# Patient Record
(demographics unavailable — no encounter records)

---

## 2025-04-21 NOTE — ADDENDUM
[FreeTextEntry1] : I spent greater than 75% of consult in face-to-face counseling and coordination of care in this patient with a history of a right breast cancer who underwent bilateral mastectomies and comes in now for routine breast cancer screening/surveillance.

## 2025-04-21 NOTE — HISTORY OF PRESENT ILLNESS
[FreeTextEntry1] : The patient is a 64-year-old G4, P3 postmenopausal white female of [place] and [place] descent.  She underwent menarche at age 10 and had her first child at age 29.  She underwent menopause at age 50 and never took any hormone replacement therapy.  She has no family history of breast or ovarian cancer.  Her brother passed away from pancreatic cancer at age 66.  The patient had a gastric sleeve operation in August 2018 and had lost about 90 pounds over about 6 months.  She underwent a screening mammography on March 14, 2019 at [facility] [facility] in the [facility] and was noted to have some increasing calcifications in the right breast lower outer quadrant as well as subareolar region.  Stereotactic biopsy was performed in the right breast retroareolar region and right breast lower outer quadrant on March 27, 2019 and the retroareolar calcifications were benign but the lower outer quadrant area did show some stromal fibrosis and some classical type LCIS.  She underwent a partial mastectomy of this area of LCIS in the right breast lower outer quadrant on April 9, 2019 and did have a hematoma found at the time of the excision and I could not find the clip however the entire biopsy cavity was removed which was confirmed on post excision diagnostic mammography.  The wide excision specimen did show an 8 mm focus of classical type invasive lobular cancer which was ER positive greater than 95%, OH positive greater than 90%, HER-2/tami negative with a low Ki-67 of 10%.  Margins were all negative but there was some LCIS on the posterior and anterior margins.  This was a pathologic prognostic stage IA breast cancer.  [employer] genetic panel testing from April 2019 was negative.  She then underwent a bilateral breast MRI postoperatively on April 17, 2019 just showing postsurgical changes in the right breast and was brought back to the operating room and underwent a right breast sentinel lymph node biopsy on April 18, 2019 which did show a micromet in the one sentinel node with a focus measuring 0.4 mm.  The patient saw Dr. Valentine in [place] at [facility] for a medical oncology evaluation and then did follow-up with Dr. Drew Smalls for short period of time.  She decided to undergo bilateral prophylactic nipple sparing mastectomies with bilateral direct to implant reconstructions which was performed with Dr. Santacruz on May 30, 2019.  Final pathology just showed some classical type LCIS remaining in the right breast and the left mastectomy just showed intraductal papillomas.  Later review of that pathology did show some residual invasive lobular cancer around the previous wide excision site but this did not change the patient's management or stage.  She comes in now for routine follow-up and was on Arimidex but was switched to Femara and was following up with Dr. Valentine in [place], [place], however Dr. Valentine  stopped practicing.  She had exchange of her implants by Dr. Santacruz on June 18, 2020.  She comes in now for routine follow-up and is currently on Letrozole and [place] [facility]

## 2025-04-21 NOTE — ASSESSMENT
[FreeTextEntry1] : The patient is a 64-year-old G4, P3 postmenopausal white female of [place] and [place] descent. She underwent menarche at age 10 and had her first child at age 29. She underwent menopause at age 50 and never took any hormone replacement therapy. She has no family history of breast or ovarian cancer. Her brother passed away from pancreatic cancer at age 66. The patient had a gastric sleeve operation in August 2018 and had lost about 90 pounds over about 6 months. She underwent a screening mammography on March 14, 2019 at [facility] in the [facility] and was noted to have some increasing calcifications in the right breast lower outer quadrant as well as subareolar region. Stereotactic biopsy was performed in the right breast retroareolar region and right breast lower outer quadrant on March 27, 2019 and the retroareolar calcifications were benign but the lower outer quadrant area did show some stromal fibrosis and some classical type LCIS. She underwent a partial mastectomy of this area of LCIS in the right breast lower outer quadrant on April 9, 2019 and did have a hematoma found at the time of the excision and I could not find the clip however the entire biopsy cavity was removed which was confirmed on post excision diagnostic mammography. The wide excision specimen did show an 8 mm focus of classical type invasive lobular cancer which was ER positive greater than 95%, MO positive greater than 90%, HER-2/tami negative with a low Ki-67 of 10%. Margins were all negative but there was some LCIS on the posterior and anterior margins. [employer] genetic panel testing from April 2019 was negative. She then underwent a bilateral breast MRI postoperatively on April 17, 2019 just showing postsurgical changes in the right breast and was brought back to the operating room and underwent a right breast sentinel lymph node biopsy on April 18, 2019 which did show a micromet in the one sentinel node with a focus measuring 0.4 mm. This was a pathologic prognostic stage IA breast cancer. The patient saw Dr. Valentine in [place] at [facility] for medical oncology evaluation and then did follow-up with Dr. Drew Smalls for a short period of time. She decided to undergo bilateral prophylactic nipple sparing mastectomies with bilateral direct to implant reconstructions which was performed with Dr. Santacruz on May 30, 2019. Final pathology just showed some classical type LCIS remaining in the right breast and the left mastectomy just showed intraductal papillomas. Later review of that pathology did show some residual invasive lobular cancer around the previous wide excision site but this did not change the patient's management or stage. She comes in now for routine follow-up and was on Arimidex and was following up with Dr. Valentine in [place], [place]. She had exchange of her implants by Dr. Santacruz on June 18, 2020. She saw Dr. Taylor in [place], [place] and underwent exchange of her implants with further placement of AlloDerm on October 24, 2024. On exam today, I cannot feel any suspicious findings over either implant.  The thinning out on the lateral aspect of the right breast has been improved with implant exchange and placement of AlloDerm.  I cannot feel any signs of any implant rupture however. She was reassured and I would like to see her again in 1 year in April 2026. She was on Arimidex but was switched to Femara. Unfortunately, Dr. Valentine stopped practicing. She has been having high cholesterol and symptoms on the AI's and did eventually see Dr. Beltran at [facility] who kept her on letrozole. She is on Repatha which is the injectable cholesterol medication.  All of her issues on the letrozole have now resolved with some alternative therapies and diet changes and she is feeling well and has been able to continue on the letrozole.  She tells me she has some osteopenia but is managing with exercising and maintaining her vitamin D and calcium.

## 2025-04-21 NOTE — HISTORY OF PRESENT ILLNESS
[FreeTextEntry1] : The patient is a 64-year-old G4, P3 postmenopausal white female of [place] and [place] descent.  She underwent menarche at age 10 and had her first child at age 29.  She underwent menopause at age 50 and never took any hormone replacement therapy.  She has no family history of breast or ovarian cancer.  Her brother passed away from pancreatic cancer at age 66.  The patient had a gastric sleeve operation in August 2018 and had lost about 90 pounds over about 6 months.  She underwent a screening mammography on March 14, 2019 at [place] [facility] in the [facility] and was noted to have some increasing calcifications in the right breast lower outer quadrant as well as subareolar region.  Stereotactic biopsy was performed in the right breast retroareolar region and right breast lower outer quadrant on March 27, 2019 and the retroareolar calcifications were benign but the lower outer quadrant area did show some stromal fibrosis and some classical type LCIS.  She underwent a partial mastectomy of this area of LCIS in the right breast lower outer quadrant on April 9, 2019 and did have a hematoma found at the time of the excision and I could not find the clip however the entire biopsy cavity was removed which was confirmed on post excision diagnostic mammography.  The wide excision specimen did show an 8 mm focus of classical type invasive lobular cancer which was ER positive greater than 95%, VA positive greater than 90%, HER-2/tami negative with a low Ki-67 of 10%.  Margins were all negative but there was some LCIS on the posterior and anterior margins.  This was a pathologic prognostic stage IA breast cancer.  [employer] genetic panel testing from April 2019 was negative.  She then underwent a bilateral breast MRI postoperatively on April 17, 2019 just showing postsurgical changes in the right breast and was brought back to the operating room and underwent a right breast sentinel lymph node biopsy on April 18, 2019 which did show a micromet in the one sentinel node with a focus measuring 0.4 mm.  The patient saw Dr. Valentine in [place] at [facility] for a medical oncology evaluation and then did follow-up with Dr. Drew Smalls for short period of time.  She decided to undergo bilateral prophylactic nipple sparing mastectomies with bilateral direct to implant reconstructions which was performed with Dr. Santacruz on May 30, 2019.  Final pathology just showed some classical type LCIS remaining in the right breast and the left mastectomy just showed intraductal papillomas.  Later review of that pathology did show some residual invasive lobular cancer around the previous wide excision site but this did not change the patient's management or stage.  She comes in now for routine follow-up and was on Arimidex but was switched to Femara and was following up with Dr. Valentine in [place], [place], however Dr. Valentine  stopped practicing.  She had exchange of her implants by Dr. Santacruz on June 18, 2020.  She comes in now for routine follow-up and is currently on Letrozole and [place] [facility]

## 2025-04-21 NOTE — ASSESSMENT
[FreeTextEntry1] : The patient is a 64-year-old G4, P3 postmenopausal white female of [place] and [place] descent. She underwent menarche at age 10 and had her first child at age 29. She underwent menopause at age 50 and never took any hormone replacement therapy. She has no family history of breast or ovarian cancer. Her brother passed away from pancreatic cancer at age 66. The patient had a gastric sleeve operation in August 2018 and had lost about 90 pounds over about 6 months. She underwent a screening mammography on March 14, 2019 at [place] [facility] in the [facility] and was noted to have some increasing calcifications in the right breast lower outer quadrant as well as subareolar region. Stereotactic biopsy was performed in the right breast retroareolar region and right breast lower outer quadrant on March 27, 2019 and the retroareolar calcifications were benign but the lower outer quadrant area did show some stromal fibrosis and some classical type LCIS. She underwent a partial mastectomy of this area of LCIS in the right breast lower outer quadrant on April 9, 2019 and did have a hematoma found at the time of the excision and I could not find the clip however the entire biopsy cavity was removed which was confirmed on post excision diagnostic mammography. The wide excision specimen did show an 8 mm focus of classical type invasive lobular cancer which was ER positive greater than 95%, UT positive greater than 90%, HER-2/tami negative with a low Ki-67 of 10%. Margins were all negative but there was some LCIS on the posterior and anterior margins. [employer] genetic panel testing from April 2019 was negative. She then underwent a bilateral breast MRI postoperatively on April 17, 2019 just showing postsurgical changes in the right breast and was brought back to the operating room and underwent a right breast sentinel lymph node biopsy on April 18, 2019 which did show a micromet in the one sentinel node with a focus measuring 0.4 mm. This was a pathologic prognostic stage IA breast cancer. The patient saw Dr. Valentine in [place] at [facility] for medical oncology evaluation and then did follow-up with Dr. Drew Smalls for a short period of time. She decided to undergo bilateral prophylactic nipple sparing mastectomies with bilateral direct to implant reconstructions which was performed with Dr. Santacruz on May 30, 2019. Final pathology just showed some classical type LCIS remaining in the right breast and the left mastectomy just showed intraductal papillomas. Later review of that pathology did show some residual invasive lobular cancer around the previous wide excision site but this did not change the patient's management or stage. She comes in now for routine follow-up and was on Arimidex and was following up with Dr. Valentine in [place], [place]. She had exchange of her implants by Dr. Santacruz on June 18, 2020. She saw Dr. Taylor in [place], [place] and underwent exchange of her implants with further placement of AlloDerm on October 24, 2024. On exam today, I cannot feel any suspicious findings over either implant.  The thinning out on the lateral aspect of the right breast has been improved with implant exchange and placement of AlloDerm.  I cannot feel any signs of any implant rupture however. She was reassured and I would like to see her again in 1 year in April 2026. She was on Arimidex but was switched to Femara. Unfortunately, Dr. Valentine stopped practicing. She has been having high cholesterol and symptoms on the AI's and did eventually see Dr. Beltran at [facility] who kept her on letrozole. She is on Repatha which is the injectable cholesterol medication.  All of her issues on the letrozole have now resolved with some alternative therapies and diet changes and she is feeling well and has been able to continue on the letrozole.  She tells me she has some osteopenia but is managing with exercising and maintaining her vitamin D and calcium.

## 2025-04-21 NOTE — REASON FOR VISIT
[Follow-Up: _____] : a [unfilled] follow-up visit [FreeTextEntry1] : The patient comes in for routine follow-up with a history of a right breast cancer initially found as calcifications in the lower outer aspect of the right breast at which time she underwent a biopsy showing classical type LCIS.  At the time of excision she was found to have an 8 mm focus of classical invasive lobular cancer ER/AK positive HER-2/tami negative with a low Ki-67 making this a pathologic prognostic stage IA breast cancer.  She had some LCIS in the posterior and anterior margins and she was brought back to the operating room in April 2019 for sentinel lymph node biopsy was found to have a micromet in one of the sentinel nodes with a focus measuring 0.4 mm.  She then decided undergo bilateral prophylactic nipple sparing mastectomies and had bilateral direct to implant reconstructions on May 30, 2019 with the final pathology just showing some more classical type LCIS in the right breast.  She was placed on Arimidex and she was following up with Dr. Valentine in [place] but is now following up with Dr. Beltran at the [place] [facility] and has been on letrozole. [FreeTextEntry2] : May 30, 2019

## 2025-04-21 NOTE — PHYSICAL EXAM
[Normocephalic] : normocephalic [Atraumatic] : atraumatic [EOMI] : extra ocular movement intact [Supple] : supple [No Supraclavicular Adenopathy] : no supraclavicular adenopathy [No Cervical Adenopathy] : no cervical adenopathy [Examined in the supine and seated position] : examined in the supine and seated position [No dominant masses] : no dominant masses in right breast  [No dominant masses] : no dominant masses left breast [No Nipple Retraction] : no left nipple retraction [No Nipple Discharge] : no left nipple discharge [Breast Mass Right Breast ___cm] : no masses [Breast Mass Left Breast ___cm] : no masses [No Axillary Lymphadenopathy] : no left axillary lymphadenopathy [No Edema] : no edema [No Rashes] : no rashes [No Ulceration] : no ulceration [Breast Nipple Inversion] : nipples not inverted [Breast Nipple Retraction] : nipples not retracted [Breast Nipple Flattening] : nipples not flattened [Breast Nipple Fissures] : nipples not fissured [Breast Abnormal Lactation (Galactorrhea)] : no galactorrhea [Breast Abnormal Secretion Bloody Fluid] : no bloody discharge [Breast Abnormal Secretion Serous Fluid] : no serous discharge [Breast Abnormal Secretion Opalescent Fluid] : no milky discharge [de-identified] : On exam, the patient has bilateral nipple sparing mastectomies and had direct to implant reconstructions.  She did have some mild epidermolysis on the right side which completely resolved after the surgery.  She has some animation bilaterally and some loose skin but has no suspicious densities over either implant.  She has no axillary, supraclavicular, or cervical adenopathy.  She has no sensation in either nipple areolar complex or inferior poles.  She has good sensation on the lateral aspect of the right breast and superior aspect of the left breast.  She has decreased sensation on the superior aspect of the right breast and medial aspect of the right breast and lateral aspect of the left breast. [de-identified] : Status post nipple sparing mastectomy and direct to implant reconstruction with no evidence of recurrence [de-identified] : Status post nipple sparing mastectomy and direct to implant reconstruction with no suspicious findings

## 2025-04-21 NOTE — PHYSICAL EXAM
[Normocephalic] : normocephalic [Atraumatic] : atraumatic [EOMI] : extra ocular movement intact [Supple] : supple [No Supraclavicular Adenopathy] : no supraclavicular adenopathy [No Cervical Adenopathy] : no cervical adenopathy [Examined in the supine and seated position] : examined in the supine and seated position [No dominant masses] : no dominant masses in right breast  [No dominant masses] : no dominant masses left breast [No Nipple Retraction] : no left nipple retraction [No Nipple Discharge] : no left nipple discharge [Breast Mass Right Breast ___cm] : no masses [Breast Mass Left Breast ___cm] : no masses [No Axillary Lymphadenopathy] : no left axillary lymphadenopathy [No Edema] : no edema [No Rashes] : no rashes [No Ulceration] : no ulceration [Breast Nipple Inversion] : nipples not inverted [Breast Nipple Retraction] : nipples not retracted [Breast Nipple Flattening] : nipples not flattened [Breast Nipple Fissures] : nipples not fissured [Breast Abnormal Lactation (Galactorrhea)] : no galactorrhea [Breast Abnormal Secretion Bloody Fluid] : no bloody discharge [Breast Abnormal Secretion Serous Fluid] : no serous discharge [Breast Abnormal Secretion Opalescent Fluid] : no milky discharge [de-identified] : On exam, the patient has bilateral nipple sparing mastectomies and had direct to implant reconstructions.  She did have some mild epidermolysis on the right side which completely resolved after the surgery.  She has some animation bilaterally and some loose skin but has no suspicious densities over either implant.  She has no axillary, supraclavicular, or cervical adenopathy.  She has no sensation in either nipple areolar complex or inferior poles.  She has good sensation on the lateral aspect of the right breast and superior aspect of the left breast.  She has decreased sensation on the superior aspect of the right breast and medial aspect of the right breast and lateral aspect of the left breast. [de-identified] : Status post nipple sparing mastectomy and direct to implant reconstruction with no evidence of recurrence [de-identified] : Status post nipple sparing mastectomy and direct to implant reconstruction with no suspicious findings

## 2025-04-21 NOTE — REASON FOR VISIT
[Follow-Up: _____] : a [unfilled] follow-up visit [FreeTextEntry1] : The patient comes in for routine follow-up with a history of a right breast cancer initially found as calcifications in the lower outer aspect of the right breast at which time she underwent a biopsy showing classical type LCIS.  At the time of excision she was found to have an 8 mm focus of classical invasive lobular cancer ER/PA positive HER-2/tami negative with a low Ki-67 making this a pathologic prognostic stage IA breast cancer.  She had some LCIS in the posterior and anterior margins and she was brought back to the operating room in April 2019 for sentinel lymph node biopsy was found to have a micromet in one of the sentinel nodes with a focus measuring 0.4 mm.  She then decided undergo bilateral prophylactic nipple sparing mastectomies and had bilateral direct to implant reconstructions on May 30, 2019 with the final pathology just showing some more classical type LCIS in the right breast.  She was placed on Arimidex and she was following up with Dr. Valentine in [place] but is now following up with Dr. Beltran at the [place] [facility] and has been on letrozole. [FreeTextEntry2] : May 30, 2019